# Patient Record
Sex: FEMALE | Race: ASIAN | NOT HISPANIC OR LATINO | URBAN - METROPOLITAN AREA
[De-identification: names, ages, dates, MRNs, and addresses within clinical notes are randomized per-mention and may not be internally consistent; named-entity substitution may affect disease eponyms.]

---

## 2021-02-03 ENCOUNTER — LAB OUTSIDE AN ENCOUNTER (OUTPATIENT)
Dept: URBAN - METROPOLITAN AREA CLINIC 78 | Facility: CLINIC | Age: 51
End: 2021-02-03

## 2021-02-03 ENCOUNTER — OFFICE VISIT (OUTPATIENT)
Dept: URBAN - METROPOLITAN AREA CLINIC 78 | Facility: CLINIC | Age: 51
End: 2021-02-03
Payer: COMMERCIAL

## 2021-02-03 DIAGNOSIS — Z12.11 COLON CANCER SCREENING: ICD-10-CM

## 2021-02-03 DIAGNOSIS — R79.89 ELEVATED LFTS: ICD-10-CM

## 2021-02-03 DIAGNOSIS — Z80.0 FAMILY HISTORY OF COLON CANCER: ICD-10-CM

## 2021-02-03 DIAGNOSIS — K21.9 GERD: ICD-10-CM

## 2021-02-03 PROCEDURE — G8482 FLU IMMUNIZE ORDER/ADMIN: HCPCS | Performed by: INTERNAL MEDICINE

## 2021-02-03 PROCEDURE — 99204 OFFICE O/P NEW MOD 45 MIN: CPT | Performed by: INTERNAL MEDICINE

## 2021-02-03 PROCEDURE — G8420 CALC BMI NORM PARAMETERS: HCPCS | Performed by: INTERNAL MEDICINE

## 2021-02-03 PROCEDURE — G8427 DOCREV CUR MEDS BY ELIG CLIN: HCPCS | Performed by: INTERNAL MEDICINE

## 2021-02-03 RX ORDER — ATORVASTATIN CALCIUM 20 MG/1
1 TABLET TABLET, FILM COATED ORAL ONCE A DAY
Status: ACTIVE | COMMUNITY

## 2021-02-03 RX ORDER — SODIUM, POTASSIUM,MAG SULFATES 17.5-3.13G
177 ML SOLUTION, RECONSTITUTED, ORAL ORAL
Qty: 354 ML | Refills: 0 | OUTPATIENT
Start: 2021-02-03 | End: 2021-02-04

## 2021-02-03 NOTE — HPI-TODAY'S VISIT:
The patient presents for a colon cancer screening.  Patient has never had a colonoscopy.  There is a family history of colon cancer in father Patient denies change in bowel habits, appetite and weight.  Patient denies bleeding per rectum.  Patient presents with complains of heartburn for the last few weeks Se has increased burping and belching Patient experiences the symptoms both during the day and night.   Recently patient has been found to have elevated ALT An US of the RUQ is wnl Patient has recently started taking Atorvastatin

## 2021-02-25 LAB
ACTIN (SMOOTH MUSCLE) ANTIBODY: 5
ANA DIRECT: NEGATIVE
CERULOPLASMIN: 28.3
CHOLESTEROL, TOTAL: 180
COMMENT:: (no result)
FERRITIN, SERUM: 140
HBSAG SCREEN: NEGATIVE
HDL CHOLESTEROL: 62
HEP A AB, IGM: NEGATIVE
HEP B CORE AB, IGM: NEGATIVE
HEP C VIRUS AB: <0.1
LDL CHOL CALC (NIH): 107
MITOCHONDRIAL (M2) ANTIBODY: <20
TRIGLYCERIDES: 57
VLDL CHOLESTEROL CAL: 11

## 2021-03-18 ENCOUNTER — OFFICE VISIT (OUTPATIENT)
Dept: URBAN - METROPOLITAN AREA SURGERY CENTER 15 | Facility: SURGERY CENTER | Age: 51
End: 2021-03-18
Payer: COMMERCIAL

## 2021-03-18 DIAGNOSIS — K29.60 ADENOPAPILLOMATOSIS GASTRICA: ICD-10-CM

## 2021-03-18 DIAGNOSIS — K22.8 COLUMNAR-LINED ESOPHAGUS: ICD-10-CM

## 2021-03-18 DIAGNOSIS — B96.81 BACTERIAL INFECTION DUE TO H. PYLORI: ICD-10-CM

## 2021-03-18 DIAGNOSIS — Z12.11 COLON CANCER SCREENING: ICD-10-CM

## 2021-03-18 DIAGNOSIS — Z80.0 FAMILY HISTORY MALIGNANT NEOPLASM OF BILIARY TRACT: ICD-10-CM

## 2021-03-18 PROCEDURE — G8907 PT DOC NO EVENTS ON DISCHARG: HCPCS | Performed by: INTERNAL MEDICINE

## 2021-03-18 PROCEDURE — 43239 EGD BIOPSY SINGLE/MULTIPLE: CPT | Performed by: INTERNAL MEDICINE

## 2021-03-18 PROCEDURE — G0105 COLORECTAL SCRN; HI RISK IND: HCPCS | Performed by: INTERNAL MEDICINE

## 2021-03-24 ENCOUNTER — TELEPHONE ENCOUNTER (OUTPATIENT)
Dept: URBAN - METROPOLITAN AREA CLINIC 92 | Facility: CLINIC | Age: 51
End: 2021-03-24

## 2021-03-24 RX ORDER — AMOXICILLIN 500 MG/1
2 CAPSULES TABLET, FILM COATED ORAL
Qty: 56 CAPSULE | Refills: 0 | OUTPATIENT
Start: 2021-03-24 | End: 2021-04-07

## 2021-03-24 RX ORDER — CLARITHROMYCIN 500 MG/1
1 TABLET TABLET, FILM COATED ORAL
Qty: 28 TABLET | Refills: 0 | OUTPATIENT
Start: 2021-03-24 | End: 2021-04-07

## 2021-03-24 RX ORDER — OMEPRAZOLE 40 MG/1
1 CAPSULE 30 MINUTES BEFORE MORNING MEAL CAPSULE, DELAYED RELEASE ORAL TWICE A DAY
Qty: 28 | Refills: 0 | OUTPATIENT
Start: 2021-03-24

## 2021-03-25 ENCOUNTER — LAB OUTSIDE AN ENCOUNTER (OUTPATIENT)
Dept: URBAN - METROPOLITAN AREA CLINIC 78 | Facility: CLINIC | Age: 51
End: 2021-03-25

## 2021-04-27 ENCOUNTER — LAB OUTSIDE AN ENCOUNTER (OUTPATIENT)
Dept: URBAN - METROPOLITAN AREA CLINIC 78 | Facility: CLINIC | Age: 51
End: 2021-04-27

## 2021-04-27 ENCOUNTER — OFFICE VISIT (OUTPATIENT)
Dept: URBAN - METROPOLITAN AREA CLINIC 78 | Facility: CLINIC | Age: 51
End: 2021-04-27
Payer: COMMERCIAL

## 2021-04-27 ENCOUNTER — DASHBOARD ENCOUNTERS (OUTPATIENT)
Age: 51
End: 2021-04-27

## 2021-04-27 ENCOUNTER — OFFICE VISIT (OUTPATIENT)
Dept: URBAN - METROPOLITAN AREA CLINIC 77 | Facility: CLINIC | Age: 51
End: 2021-04-27
Payer: COMMERCIAL

## 2021-04-27 DIAGNOSIS — R79.89 ELEVATED LFTS: ICD-10-CM

## 2021-04-27 DIAGNOSIS — Z12.11 COLON CANCER SCREENING: ICD-10-CM

## 2021-04-27 DIAGNOSIS — B96.81 HELICOBACTER PYLORI [H. PYLORI] AS THE CAUSE OF DISEASES CLASSIFIED ELSEWHERE: ICD-10-CM

## 2021-04-27 DIAGNOSIS — K21.9 GERD: ICD-10-CM

## 2021-04-27 DIAGNOSIS — A04.8 HELICOBACTER PYLORI (H. PYLORI): ICD-10-CM

## 2021-04-27 DIAGNOSIS — Z80.0 FAMILY HISTORY OF COLON CANCER: ICD-10-CM

## 2021-04-27 DIAGNOSIS — K29.70 GASTRITIS, UNSPECIFIED, WITHOUT BLEEDING: ICD-10-CM

## 2021-04-27 PROBLEM — 708164002: Status: ACTIVE | Noted: 2021-03-25

## 2021-04-27 PROBLEM — 235595009 GASTROESOPHAGEAL REFLUX DISEASE: Status: ACTIVE | Noted: 2021-02-03

## 2021-04-27 PROCEDURE — 83014 H PYLORI DRUG ADMIN: CPT | Performed by: INTERNAL MEDICINE

## 2021-04-27 PROCEDURE — 99214 OFFICE O/P EST MOD 30 MIN: CPT | Performed by: INTERNAL MEDICINE

## 2021-04-27 RX ORDER — ATORVASTATIN CALCIUM 20 MG/1
1 TABLET TABLET, FILM COATED ORAL ONCE A DAY
Status: ACTIVE | COMMUNITY

## 2021-04-27 RX ORDER — OMEPRAZOLE 40 MG/1
1 CAPSULE 30 MINUTES BEFORE MORNING MEAL CAPSULE, DELAYED RELEASE ORAL TWICE A DAY
Qty: 28 | Refills: 0 | Status: ACTIVE | COMMUNITY
Start: 2021-03-24

## 2021-04-27 NOTE — HPI-TODAY'S VISIT:
Patient is here in f/u She denies any new complaints Recent labs show mild elevation of ALT and LDL cholesterol Recent US of the abdo shows mild fatty liver SHe is on Atorvastatin

## 2025-04-21 NOTE — PHYSICAL EXAM CONSTITUTIONAL:
PA for Repatha started under CMM.       well developed, well nourished , in no acute distress , ambulating without difficulty , normal communication ability